# Patient Record
Sex: FEMALE | Race: WHITE | NOT HISPANIC OR LATINO
[De-identification: names, ages, dates, MRNs, and addresses within clinical notes are randomized per-mention and may not be internally consistent; named-entity substitution may affect disease eponyms.]

---

## 2018-05-02 ENCOUNTER — TRANSCRIPTION ENCOUNTER (OUTPATIENT)
Age: 53
End: 2018-05-02

## 2018-08-15 ENCOUNTER — OTHER (OUTPATIENT)
Age: 53
End: 2018-08-15

## 2019-06-16 ENCOUNTER — EMERGENCY (EMERGENCY)
Facility: HOSPITAL | Age: 54
LOS: 1 days | Discharge: HOME | End: 2019-06-16
Attending: EMERGENCY MEDICINE | Admitting: EMERGENCY MEDICINE
Payer: COMMERCIAL

## 2019-06-16 VITALS
WEIGHT: 125 LBS | OXYGEN SATURATION: 98 % | SYSTOLIC BLOOD PRESSURE: 99 MMHG | DIASTOLIC BLOOD PRESSURE: 72 MMHG | HEIGHT: 65 IN | TEMPERATURE: 99 F | HEART RATE: 79 BPM | RESPIRATION RATE: 18 BRPM

## 2019-06-16 DIAGNOSIS — Z90.49 ACQUIRED ABSENCE OF OTHER SPECIFIED PARTS OF DIGESTIVE TRACT: Chronic | ICD-10-CM

## 2019-06-16 DIAGNOSIS — Z98.1 ARTHRODESIS STATUS: Chronic | ICD-10-CM

## 2019-06-16 LAB
FLU A RESULT: SIGNIFICANT CHANGE UP
FLU A RESULT: SIGNIFICANT CHANGE UP
FLUAV AG NPH QL: SIGNIFICANT CHANGE UP
FLUBV AG NPH QL: SIGNIFICANT CHANGE UP
RSV RESULT: SIGNIFICANT CHANGE UP
RSV RNA RESP QL NAA+PROBE: SIGNIFICANT CHANGE UP

## 2019-06-16 PROCEDURE — 99283 EMERGENCY DEPT VISIT LOW MDM: CPT

## 2019-06-16 PROCEDURE — 87631 RESP VIRUS 3-5 TARGETS: CPT

## 2019-06-16 PROCEDURE — 87486 CHLMYD PNEUM DNA AMP PROBE: CPT

## 2019-06-16 PROCEDURE — 87798 DETECT AGENT NOS DNA AMP: CPT

## 2019-06-16 PROCEDURE — 86618 LYME DISEASE ANTIBODY: CPT

## 2019-06-16 PROCEDURE — 87633 RESP VIRUS 12-25 TARGETS: CPT

## 2019-06-16 PROCEDURE — 87581 M.PNEUMON DNA AMP PROBE: CPT

## 2019-06-16 PROCEDURE — 36415 COLL VENOUS BLD VENIPUNCTURE: CPT

## 2019-06-16 NOTE — ED PROVIDER NOTE - CLINICAL SUMMARY MEDICAL DECISION MAKING FREE TEXT BOX
2d fever and body aches - no specific symptoms otherwise to narrow differential - will check for flu and lymes, pt to f/u with pcp as needed, return to ED for worsening

## 2019-06-16 NOTE — ED ADULT NURSE NOTE - RESPIRATORY WDL
Infusion Nursing Note:  Jeffrey Real presents today for add-on infusion.    Patient seen by provider today: Yes: Russel Bliss   present during visit today: Not Applicable.    Note: Labs were monitored    Intravenous Access:  Implanted Port.  Post infusion, line was flushed with normal saline and heparin.  Port was deaccessed.    Treatment Conditions:  Per Provider order, Patient received an add-on IV fluid infusion (elevated creatinine & calcium.)      Post Infusion Assessment:  Patient tolerated infusion without incident.    Discharge Plan:   Patient discharged in stable condition accompanied by: wife.    WILLA GARCÍA RN                        
Breathing spontaneous and unlabored. Breath sounds clear and equal bilaterally with regular rhythm.

## 2019-06-16 NOTE — ED ADULT NURSE NOTE - CHIEF COMPLAINT QUOTE
Body ache and a fever of 101.4 at home today at 6PM. Took Motrin 400mg and feel a little better. Denies respiratory problems S/p right maxilla reconstruction, right canthoplexy and right scar revision. hx of ameloblastoma Anterior caniofacial approach, parital clivectomy, middle fossa resection, orbital exenteration, radial forearm flap, rectus muscle flap

## 2019-06-16 NOTE — ED ADULT NURSE REASSESSMENT NOTE - NS ED NURSE REASSESS COMMENT FT1
pt seen, examined and discharged by dr fox without nurse assist. blood work and nasal swab sent to lab

## 2019-06-16 NOTE — ED PROVIDER NOTE - SKIN, MLM
Skin normal color for race, warm, dry and intact. No evidence of rash. external genitalia no rash, no papules. single punctant scab type lesion to left labial majora

## 2019-06-16 NOTE — ED ADULT TRIAGE NOTE - CHIEF COMPLAINT QUOTE
Body ache and a fever of 101.4 at home today at 6PM. Took Motrin 400mg and feel a little better. Denies respiratory problems

## 2019-06-16 NOTE — ED ADULT NURSE NOTE - OBJECTIVE STATEMENT
pt reports not feeling well since yesterday; "low energy". gradually started to feel worse. "no appetite"

## 2019-06-16 NOTE — ED PROVIDER NOTE - OBJECTIVE STATEMENT
54 y/o female with no PMHx of  presents to the ED c/o fever. +myalgia since yesterday with low energy. +HA today while playing gold. Tmax of 101.4 at home today at 1800. Pt took Motrin 400mg with sx alleviating. Denies respiratory problems. No throat pain or rhinorrhea. Decrease appetite today. States her son had a recent tick bite and thinks she had a possible tick exposure and bite. States she was in [place] for 1 week earlier this month. Former smoker 20 years ago. 52 y/o female with no PMHx of  presents to the ED c/o fever. +myalgia since yesterday with low energy. +HA earlier today while playing golf. Tmax of 101.4 at home today at 1800. Pt took Motrin 400mg with sx alleviating. Denies respiratory problems. No throat pain or rhinorrhea. Decrease appetite today. States her son had a recent tick bite and thinks she had a possible tick exposure and bite (felt something in genital area in shower, flicked it off, was a couple of wks ago, then yesterday may have seen a red spot in the same area). States she was in [place] for 1 week earlier this month. Former smoker 20 years ago.

## 2019-06-17 LAB
LYME C6 AB IGG/IGM EIA REFLEX WESTERN BL: SIGNIFICANT CHANGE UP
RAPID RVP RESULT: SIGNIFICANT CHANGE UP

## 2020-06-12 ENCOUNTER — TRANSCRIPTION ENCOUNTER (OUTPATIENT)
Age: 55
End: 2020-06-12

## 2020-06-15 ENCOUNTER — TRANSCRIPTION ENCOUNTER (OUTPATIENT)
Age: 55
End: 2020-06-15

## 2020-11-19 ENCOUNTER — OUTPATIENT (OUTPATIENT)
Dept: OUTPATIENT SERVICES | Facility: HOSPITAL | Age: 55
LOS: 1 days | End: 2020-11-19
Payer: COMMERCIAL

## 2020-11-19 ENCOUNTER — APPOINTMENT (OUTPATIENT)
Dept: RADIOLOGY | Facility: HOSPITAL | Age: 55
End: 2020-11-19
Payer: COMMERCIAL

## 2020-11-19 DIAGNOSIS — Z90.49 ACQUIRED ABSENCE OF OTHER SPECIFIED PARTS OF DIGESTIVE TRACT: Chronic | ICD-10-CM

## 2020-11-19 DIAGNOSIS — M50.20 OTHER CERVICAL DISC DISPLACEMENT, UNSPECIFIED CERVICAL REGION: ICD-10-CM

## 2020-11-19 DIAGNOSIS — Z98.1 ARTHRODESIS STATUS: Chronic | ICD-10-CM

## 2020-11-19 PROBLEM — Z78.9 OTHER SPECIFIED HEALTH STATUS: Chronic | Status: ACTIVE | Noted: 2019-06-16

## 2020-11-19 PROCEDURE — 72040 X-RAY EXAM NECK SPINE 2-3 VW: CPT | Mod: 26

## 2020-11-19 PROCEDURE — 72040 X-RAY EXAM NECK SPINE 2-3 VW: CPT

## 2023-06-21 ENCOUNTER — APPOINTMENT (OUTPATIENT)
Dept: OBGYN | Facility: CLINIC | Age: 58
End: 2023-06-21
Payer: COMMERCIAL

## 2023-06-21 VITALS
DIASTOLIC BLOOD PRESSURE: 72 MMHG | TEMPERATURE: 98 F | HEART RATE: 69 BPM | SYSTOLIC BLOOD PRESSURE: 118 MMHG | OXYGEN SATURATION: 99 % | HEIGHT: 65 IN | WEIGHT: 125 LBS | BODY MASS INDEX: 20.83 KG/M2

## 2023-06-21 DIAGNOSIS — N76.0 ACUTE VAGINITIS: ICD-10-CM

## 2023-06-21 DIAGNOSIS — R92.2 INCONCLUSIVE MAMMOGRAM: ICD-10-CM

## 2023-06-21 DIAGNOSIS — Z01.419 ENCOUNTER FOR GYNECOLOGICAL EXAMINATION (GENERAL) (ROUTINE) W/OUT ABNORMAL FINDINGS: ICD-10-CM

## 2023-06-21 PROCEDURE — 99396 PREV VISIT EST AGE 40-64: CPT

## 2023-06-21 NOTE — HISTORY OF PRESENT ILLNESS
[FreeTextEntry1] : 57  presents today for well woman exam.\par \par LMP: \par \par POB: svdx3\par PGYN: , nl pap\par PMH: denies\par PSH: cervical spine surgery\par Med: denies\par All: NKMA\par SH: denies--former smoker\par FH: father recently passed from lung ca\par \par Mammo: 2022 nl per patient\par Colonoscopy: within range per patient--due in 10 years\par \par

## 2023-06-21 NOTE — PLAN
[FreeTextEntry1] : \par \par I spent the time noted on the day of this patient encounter preparing for, providing and documenting the above service. I have  counseled and educated the patient on the differential, workup, disease course, and treatment/management plan. Education was provided to the patient during this encounter. All questions and concerns were answered and addressed in detail.\par \par Jill Pearson MD\par

## 2023-06-27 LAB
C TRACH RRNA SPEC QL NAA+PROBE: NOT DETECTED
CANDIDA VAG CYTO: NOT DETECTED
CYTOLOGY CVX/VAG DOC THIN PREP: ABNORMAL
G VAGINALIS+PREV SP MTYP VAG QL MICRO: NOT DETECTED
HPV HIGH+LOW RISK DNA PNL CVX: NOT DETECTED
N GONORRHOEA RRNA SPEC QL NAA+PROBE: NOT DETECTED
SOURCE AMPLIFICATION: NORMAL
T VAGINALIS VAG QL WET PREP: NOT DETECTED

## 2024-02-27 NOTE — ED PROVIDER NOTE - CARE PROVIDER_API CALL
Call and follow-up with surgeon as an outpatient.    Keep the bag in place over the hernia to help get to the drainage until you follow-up with the surgeon.    Return to ER for worsening pain or blood drainage or fevers or any other new or concerning symptoms.   primary,   Phone: (   )    -  Fax: (   )    -  Follow Up Time: 1-3 Days

## 2024-11-12 ENCOUNTER — APPOINTMENT (OUTPATIENT)
Dept: OBGYN | Facility: CLINIC | Age: 59
End: 2024-11-12
Payer: COMMERCIAL

## 2024-11-12 VITALS
BODY MASS INDEX: 20.99 KG/M2 | SYSTOLIC BLOOD PRESSURE: 120 MMHG | HEIGHT: 65 IN | TEMPERATURE: 97.6 F | WEIGHT: 126 LBS | DIASTOLIC BLOOD PRESSURE: 72 MMHG | OXYGEN SATURATION: 99 % | HEART RATE: 78 BPM

## 2024-11-12 DIAGNOSIS — Z12.31 ENCOUNTER FOR SCREENING MAMMOGRAM FOR MALIGNANT NEOPLASM OF BREAST: ICD-10-CM

## 2024-11-12 DIAGNOSIS — N76.0 ACUTE VAGINITIS: ICD-10-CM

## 2024-11-12 DIAGNOSIS — Z01.419 ENCOUNTER FOR GYNECOLOGICAL EXAMINATION (GENERAL) (ROUTINE) W/OUT ABNORMAL FINDINGS: ICD-10-CM

## 2024-11-12 DIAGNOSIS — Z11.3 ENCOUNTER FOR SCREENING FOR INFECTIONS WITH A PREDOMINANTLY SEXUAL MODE OF TRANSMISSION: ICD-10-CM

## 2024-11-12 PROCEDURE — 99396 PREV VISIT EST AGE 40-64: CPT

## 2024-11-12 PROCEDURE — 99459 PELVIC EXAMINATION: CPT

## 2024-11-13 LAB
C TRACH RRNA SPEC QL NAA+PROBE: NOT DETECTED
CANDIDA VAG CYTO: NOT DETECTED
G VAGINALIS+PREV SP MTYP VAG QL MICRO: NOT DETECTED
HPV HIGH+LOW RISK DNA PNL CVX: NOT DETECTED
N GONORRHOEA RRNA SPEC QL NAA+PROBE: NOT DETECTED
SOURCE AMPLIFICATION: NORMAL
T VAGINALIS VAG QL WET PREP: NOT DETECTED

## 2024-11-22 LAB — CYTOLOGY CVX/VAG DOC THIN PREP: ABNORMAL
